# Patient Record
Sex: FEMALE | Race: ASIAN | HISPANIC OR LATINO | Employment: UNEMPLOYED | URBAN - METROPOLITAN AREA
[De-identification: names, ages, dates, MRNs, and addresses within clinical notes are randomized per-mention and may not be internally consistent; named-entity substitution may affect disease eponyms.]

---

## 2022-01-03 ENCOUNTER — TELEMEDICINE (OUTPATIENT)
Dept: FAMILY MEDICINE CLINIC | Facility: TELEHEALTH | Age: 32
End: 2022-01-03

## 2022-01-03 VITALS — TEMPERATURE: 98.5 F

## 2022-01-03 DIAGNOSIS — Z20.822 EXPOSURE TO COVID-19 VIRUS: Primary | ICD-10-CM

## 2022-01-03 PROCEDURE — 99202 OFFICE O/P NEW SF 15 MIN: CPT | Performed by: NURSE PRACTITIONER

## 2022-01-04 NOTE — PROGRESS NOTES
You have chosen to receive care through a telehealth visit.  Do you consent to use a video/audio connection for your medical care today? Yes     CHIEF COMPLAINT  Chief Complaint   Patient presents with   • Exposure To Known Illness         HPI  Eyad Hernandez is a 31 y.o. female  presents with complaint of xposure to  with COVID 19. Denies any symptoms. Reports she had her COVID booster today. Denies any fever or chills. No nausea vomiting. Requesting a COVID test    Review of Systems   Constitutional: Negative for chills and fever.   HENT: Negative.    Respiratory: Negative.  Negative for cough.    Gastrointestinal: Negative.  Negative for diarrhea, nausea and vomiting.   Musculoskeletal: Negative.    Neurological: Negative.    Psychiatric/Behavioral: Negative.        History reviewed. No pertinent past medical history.    No family history on file.    Social History     Socioeconomic History   • Marital status:    Tobacco Use   • Smoking status: Never Smoker   • Smokeless tobacco: Never Used         Temp 98.5 °F (36.9 °C) (Oral)   LMP 01/03/2022   Breastfeeding No     PHYSICAL EXAM(patient guided exam)  Physical Exam   Constitutional: She is oriented to person, place, and time. She appears well-developed and well-nourished. No distress.   HENT:   Head: Normocephalic and atraumatic.   Mouth/Throat: Mouth/Lips are normal.  Eyes: Conjunctivae and lids are normal.   Pulmonary/Chest: Effort normal.  No respiratory distress.  Neurological: She is alert and oriented to person, place, and time. She has normal strength.   Psychiatric: She has a normal mood and affect. Her speech is normal and behavior is normal.       Results for orders placed or performed during the hospital encounter of 10/11/21   COVID-19,[facility] ROUTINE, NP/OP SWAB IN TRANSPORT MEDIA OR ESWAB 72 HR TAT - Swab, Anterior nasal    Specimen: Anterior nasal; Swab   Result Value Ref Range    SARS-CoV-2, [place] Not Detected Not Detected    SARS-CoV-2, [place] 2 DAY TAT - Swab, Anterior nasal    Specimen: Anterior nasal; Swab   Result Value Ref Range    [facility] SARS-COV-2, [place] 2 DAY TAT Performed        Diagnoses and all orders for this visit:    1. Exposure to COVID-19 virus (Primary)  -     COVID-19,[facility] ROUTINE, NP/OP SWAB IN TRANSPORT MEDIA OR ESWAB 72 HR TAT - Swab, Nasopharynx; Future    rest  Fluids  COVID test  Quarantine   If symptoms begin followup with urgent care   Any worsening symptoms such as high fever, chest pain or SOA go to ER        FOLLOW-UP  As discussed during visit with PCP/[facility] if no improvement or Urgent Care/Emergency Department if worsening of symptoms    Patient verbalizes understanding of medication dosage, comfort measures, instructions for treatment and follow-up.    Daniela Al, YESICA  01/03/2022  23:11 EST    This visit was performed via Telehealth.  This patient has been instructed to follow-up with their primary care provider if their symptoms worsen or the treatment provided does not resolve their illness.